# Patient Record
Sex: MALE | Race: OTHER | HISPANIC OR LATINO | URBAN - METROPOLITAN AREA
[De-identification: names, ages, dates, MRNs, and addresses within clinical notes are randomized per-mention and may not be internally consistent; named-entity substitution may affect disease eponyms.]

---

## 2021-02-08 ENCOUNTER — OUTPATIENT (OUTPATIENT)
Dept: OUTPATIENT SERVICES | Facility: HOSPITAL | Age: 56
LOS: 1 days | End: 2021-02-08
Payer: COMMERCIAL

## 2021-02-08 DIAGNOSIS — Z20.828 CONTACT WITH AND (SUSPECTED) EXPOSURE TO OTHER VIRAL COMMUNICABLE DISEASES: ICD-10-CM

## 2021-02-08 LAB — SARS-COV-2 RNA SPEC QL NAA+PROBE: DETECTED

## 2021-02-08 PROCEDURE — C9803: CPT

## 2021-02-08 PROCEDURE — U0003: CPT

## 2021-02-08 PROCEDURE — U0005: CPT

## 2021-02-09 DIAGNOSIS — Z20.828 CONTACT WITH AND (SUSPECTED) EXPOSURE TO OTHER VIRAL COMMUNICABLE DISEASES: ICD-10-CM

## 2021-02-22 ENCOUNTER — TRANSCRIPTION ENCOUNTER (OUTPATIENT)
Age: 56
End: 2021-02-22

## 2021-02-25 ENCOUNTER — NON-APPOINTMENT (OUTPATIENT)
Age: 56
End: 2021-02-25

## 2021-03-04 ENCOUNTER — APPOINTMENT (OUTPATIENT)
Dept: GASTROENTEROLOGY | Facility: CLINIC | Age: 56
End: 2021-03-04
Payer: COMMERCIAL

## 2021-03-04 VITALS
HEART RATE: 79 BPM | TEMPERATURE: 98 F | BODY MASS INDEX: 32.78 KG/M2 | WEIGHT: 204 LBS | SYSTOLIC BLOOD PRESSURE: 158 MMHG | DIASTOLIC BLOOD PRESSURE: 87 MMHG | HEIGHT: 66 IN

## 2021-03-04 DIAGNOSIS — K64.4 RESIDUAL HEMORRHOIDAL SKIN TAGS: ICD-10-CM

## 2021-03-04 DIAGNOSIS — Z12.11 ENCOUNTER FOR SCREENING FOR MALIGNANT NEOPLASM OF COLON: ICD-10-CM

## 2021-03-04 PROCEDURE — 99202 OFFICE O/P NEW SF 15 MIN: CPT

## 2021-03-04 PROCEDURE — 99072 ADDL SUPL MATRL&STAF TM PHE: CPT

## 2021-03-04 RX ORDER — ATORVASTATIN CALCIUM 80 MG/1
TABLET, FILM COATED ORAL
Refills: 0 | Status: ACTIVE | COMMUNITY

## 2021-03-04 RX ORDER — HYDROCORTISONE 25 MG/G
2.5 CREAM TOPICAL DAILY
Qty: 28.35 | Refills: 4 | Status: ACTIVE | COMMUNITY
Start: 2021-03-04 | End: 1900-01-01

## 2021-03-04 RX ORDER — SODIUM SULFATE, POTASSIUM SULFATE, MAGNESIUM SULFATE 17.5; 3.13; 1.6 G/ML; G/ML; G/ML
17.5-3.13-1.6 SOLUTION, CONCENTRATE ORAL
Qty: 1 | Refills: 0 | Status: ACTIVE | COMMUNITY
Start: 2021-03-04 | End: 1900-01-01

## 2021-03-04 NOTE — ASSESSMENT
[FreeTextEntry1] : 55 yo male screening colonoscopy with history of external hemorrhoid. Will use local care and arrange for colonoscopy.

## 2021-03-04 NOTE — PHYSICAL EXAM

## 2021-03-04 NOTE — HISTORY OF PRESENT ILLNESS
[de-identified] : Mr. RAEGAN ALVARADO is a 56 year old male with history of rectal discomfort. Patient noted small protuberance in the rectum and was treated with hydrocortisone with resolution. Patient denies any abdominal pain. Patient never had prior screening. No family history of colitis or colon cancer.\par

## 2021-05-01 ENCOUNTER — APPOINTMENT (OUTPATIENT)
Dept: DISASTER EMERGENCY | Facility: CLINIC | Age: 56
End: 2021-05-01

## 2021-05-01 DIAGNOSIS — Z01.818 ENCOUNTER FOR OTHER PREPROCEDURAL EXAMINATION: ICD-10-CM

## 2021-05-01 LAB — SARS-COV-2 N GENE NPH QL NAA+PROBE: NOT DETECTED

## 2021-05-04 ENCOUNTER — APPOINTMENT (OUTPATIENT)
Dept: GASTROENTEROLOGY | Facility: AMBULATORY MEDICAL SERVICES | Age: 56
End: 2021-05-04
Payer: COMMERCIAL

## 2021-05-04 ENCOUNTER — RESULT REVIEW (OUTPATIENT)
Age: 56
End: 2021-05-04

## 2021-05-04 PROCEDURE — 45385 COLONOSCOPY W/LESION REMOVAL: CPT

## 2021-12-22 ENCOUNTER — EMERGENCY (EMERGENCY)
Facility: HOSPITAL | Age: 56
LOS: 1 days | Discharge: HOME | End: 2021-12-22
Attending: STUDENT IN AN ORGANIZED HEALTH CARE EDUCATION/TRAINING PROGRAM
Payer: COMMERCIAL

## 2021-12-22 VITALS
RESPIRATION RATE: 18 BRPM | TEMPERATURE: 98 F | SYSTOLIC BLOOD PRESSURE: 134 MMHG | OXYGEN SATURATION: 94 % | HEART RATE: 84 BPM | DIASTOLIC BLOOD PRESSURE: 84 MMHG

## 2021-12-22 VITALS — HEIGHT: 66 IN | WEIGHT: 179.9 LBS
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
## 2021-12-22 DIAGNOSIS — T18.5XXA FOREIGN BODY IN ANUS AND RECTUM, INITIAL ENCOUNTER: ICD-10-CM
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
## 2021-12-22 PROCEDURE — 99285 EMERGENCY DEPT VISIT HI MDM: CPT | Mod: 25

## 2021-12-22 PROCEDURE — 46608 ANOSCOPY REMOVE FOR BODY: CPT

## 2021-12-22 PROCEDURE — 74018 RADEX ABDOMEN 1 VIEW: CPT

## 2021-12-22 PROCEDURE — 74018 RADEX ABDOMEN 1 VIEW: CPT | Mod: 26

## 2021-12-22 PROCEDURE — 99284 EMERGENCY DEPT VISIT MOD MDM: CPT

## 2021-12-22 RX ORDER — AZITHROMYCIN 500 MG/1
1 TABLET, FILM COATED ORAL
Qty: 1 | Refills: 0
Start: 2021-12-22

## 2021-12-22 NOTE — ED STATDOCS - CLINICAL SUMMARY MEDICAL DECISION MAKING FREE TEXT BOX
Here with foreign body sensation of anus, patient concerned for chicken bone, will obtain x-ray and do rectal exam in room..

## 2021-12-22 NOTE — ED STATDOCS - PATIENT PORTAL LINK FT
You can access the FollowMyHealth Patient Portal offered by [facility] by registering at the following website: [email or web address]. By joining [facility]’s FollowMyHealth portal, you will also be able to view your health information using other applications (apps) compatible with our system.

## 2021-12-22 NOTE — ED STATDOCS - ATTENDING CONTRIBUTION TO CARE
I, Damian Sampson DO, personally saw the patient with resident.  I have personally performed a face to face diagnostic evaluation on this patient and formulated the patient plan. The case was discussed with, and handed off to resident who followed the case through to the re-evaluation and disposition.

## 2021-12-22 NOTE — ED ADULT TRIAGE NOTE - CHIEF COMPLAINT QUOTE
pt c/o of abd pain. pt believes that he has a chicken bone stuck in his stomach. denies NVD, bleeding or difficulty swallowing

## 2021-12-22 NOTE — ED STATDOCS - OBJECTIVE STATEMENT
55 y/o male with no significant PMHx presents to the ED c/o foreign body in this GI tract. Pt reports he ate a chicken bone and when he went to defecate, he had pain. Pt reports the bone is stuck in his anus when he tried to wipe. Pt denies abdominal pain, nausea, vomiting, diarrhea. Pt reports he went to Urgent Care and was sent to the ED for CT scan. NKDA.

## 2021-12-22 NOTE — CONSULT NOTE ADULT - SUBJECTIVE AND OBJECTIVE BOX
55 yo male who ate a chicken bone yesterday, patient feel some anal pain while having a Bm earlier today. ER resident felt the bone in the anal canal, unable to retrieve it.    HPI:      PAST MEDICAL & SURGICAL HISTORY:    no HTN  Colonoscopy 3 months ago with polyps    REVIEW OF SYSTEMS:    CONSTITUTIONAL: No weakness, fevers or chills  EYES/ENT: No visual changes;  No vertigo or throat pain   NECK: No pain or stiffness  RESPIRATORY: No cough, wheezing, hemoptysis; No shortness of breath  CARDIOVASCULAR: No chest pain or palpitations  GASTROINTESTINAL: No abdominal or epigastric pain. No nausea, vomiting, or hematemesis; No diarrhea or constipation. No melena or hematochezia.  GENITOURINARY: No dysuria, frequency or hematuria  NEUROLOGICAL: No numbness or weakness  SKIN: No itching, burning, rashes, or lesions   All other review of systems is negative unless indicated above.    MEDICATIONS  (STANDING):    MEDICATIONS  (PRN):      Allergies    No Known Allergies    Intolerances        SOCIAL HISTORY: non smoker    FAMILY HISTORY: non contributory      Vital Signs Last 24 Hrs  T(C): 36.8 (22 Dec 2021 17:16), Max: 36.8 (22 Dec 2021 17:16)  T(F): 98.3 (22 Dec 2021 17:16), Max: 98.3 (22 Dec 2021 17:16)  HR: 84 (22 Dec 2021 17:16) (84 - 84)  BP: 134/84 (22 Dec 2021 17:16) (134/84 - 134/84)  BP(mean): 97 (22 Dec 2021 17:16) (97 - 97)  RR: 18 (22 Dec 2021 17:16) (18 - 18)  SpO2: 94% (22 Dec 2021 17:16) (94% - 94%)    .  VITAL SIGNS:  T(C): 36.8 (12-22-21 @ 17:16), Max: 36.8 (12-22-21 @ 17:16)  T(F): 98.3 (12-22-21 @ 17:16), Max: 98.3 (12-22-21 @ 17:16)  HR: 84 (12-22-21 @ 17:16) (84 - 84)  BP: 134/84 (12-22-21 @ 17:16) (134/84 - 134/84)  BP(mean): 97 (12-22-21 @ 17:16) (97 - 97)  RR: 18 (12-22-21 @ 17:16) (18 - 18)  SpO2: 94% (12-22-21 @ 17:16) (94% - 94%)  Wt(kg): --    PHYSICAL EXAM:    Constitutional: resting comfortably in bed; NAD  Eyes: PERRL, EOMI, anicteric sclera  ENT: no nasal discharge; uvula midline, no oropharyngeal erythema or exudates  Neck: supple; no JVD or thyromegaly  Respiratory: CTA B/L; no W/R/R, no retractions  Cardiac: +S1/S2; RRR; no murmurs  Gastrointestinal: soft, NT/ND; no rebound or guarding; +BSx4  FLAKO showed sharp chicken bone in the anal canal  Anoscopy performed showing a chicken bone in the anal canal which was grabbed with forcep and removed  Back: spine midline, no bony tenderness or step-offs; no CVAT B/L  Extremities: no clubbing or cyanosis; no peripheral edema  Musculoskeletal: NROM x4; no joint swelling, tenderness or erythema  Vascular: 2+ radial, femoral, DP/PT pulses B/L  Dermatologic: skin warm, dry and intact; no rashes, wounds, or scars  Lymphatic: no submandibular or cervical LAD  Neurologic: AAOx3; CNII-XII grossly intact; no focal deficits  Psychiatric: affect and characteristics of appearance, verbalizations, behaviors are appropriate    LABS:                      RADIOLOGY & ADDITIONAL STUDIES:

## 2021-12-22 NOTE — ED STATDOCS - NSFOLLOWUPINSTRUCTIONS_ED_ALL_ED_FT
Rectal Foreign Body Removal       Rectal foreign body removal is a procedure to remove an object that is stuck in the end of the large intestine (rectum). During this procedure, your health care provider will use instruments to find and remove the object through the anus. Depending on the size and location of the object, you may need to have medicine to numb the area or make you fall asleep (anesthetic). In some cases, surgery may be needed to remove the object.      Tell a health care provider about:    •Any allergies you have.      •All medicines you are taking, including vitamins, herbs, eye drops, creams, and over-the-counter medicines.      •Any problems you or family members have had with anesthetic medicines.      •Any blood disorders you have.      •Any surgeries you have had.      •Any medical conditions you have.      •Whether you are pregnant or may be pregnant.        What are the risks?  Generally, this is a safe procedure. However, problems may occur, including:  •Infection.      •Bleeding.      •Allergic reactions to medicines.      •Tearing or damage to the rectum (perforation).      •Damage to other structures or organs.        What happens before the procedure?    Medicines   Ask your health care provider about:  •Changing or stopping your regular medicines. This is especially important if you are taking diabetes medicines or blood thinners.      •Taking medicines such as aspirin and ibuprofen. These medicines can thin your blood. Do not take these medicines unless your health care provider tells you to take them.       •Taking over-the-counter medicines, vitamins, herbs, and supplements.      Surgery safety  Ask your health care provider:  •How your surgery site will be marked.    •What steps will be taken to help prevent infection. These steps may include:  •Removing hair at the surgery site.      •Washing skin with a germ-killing soap.      •Taking antibiotic medicine.        General instructions    •Your health care provider may ask you to describe the size and shape of the object inside your rectum.      •Your health care provider may feel inside your rectum with a finger (digital rectal exam).      •You may have X-rays or other imaging tests.      •Follow instructions from your health care provider about eating or drinking restrictions.      •Plan to have someone take you home from the hospital or clinic.        What happens during the procedure?    •An IV may be inserted into one of your veins.    •You may be given one or more of the following:  •A medicine to help you relax (sedative).      •A medicine to numb the area (local anesthetic).      •A medicine to make you fall asleep (general anesthetic).      •A medicine that is injected into an area of your body to numb everything below the injection site (regional anesthetic).        •A lubricating ointment may be placed inside your rectum.      •If the foreign body is not visible, an instrument called a speculum may be used to widen your anal canal.      •An instrument will be used to grasp and remove the object. You may be asked to try to help push the object out with your muscles.      •If the object is deep in the rectum, an operating scope (anoscope or proctoscope) may be used to find and remove the object.      •After the object is removed, an anoscope or proctoscope may be used to check your rectum for injury.      The procedure may vary among health care providers and hospitals.      What happens after the procedure?    •Your blood pressure, heart rate, breathing rate, and blood oxygen level may be monitored until you leave the hospital or clinic.      •If you were given a sedative during the procedure, it can affect you for several hours. Do not drive or operate machinery until your health care provider says that it is safe.        Summary    •Rectal foreign body removal is a procedure to remove an object that is stuck in the end of the large intestine (rectum).      •Ask your health care provider about stopping any medicines before the procedure.      •Plan to have someone take you home from the hospital or clinic after the procedure.      This information is not intended to replace advice given to you by your health care provider. Make sure you discuss any questions you have with your health care provider.

## 2021-12-22 NOTE — CONSULT NOTE ADULT - ASSESSMENT
55 yo male who swallowed a chicken bone  -Anoscopy performed visualizing 3-4 cm chicken bone which was removed. Procedure explained to patient before performing anoscopy. No issues.  -D/c home

## 2022-09-26 PROBLEM — Z78.9 OTHER SPECIFIED HEALTH STATUS: Chronic | Status: ACTIVE | Noted: 2021-12-29

## 2022-10-06 ENCOUNTER — APPOINTMENT (OUTPATIENT)
Dept: CT IMAGING | Facility: CLINIC | Age: 57
End: 2022-10-06

## 2024-07-25 ENCOUNTER — APPOINTMENT (OUTPATIENT)
Dept: COLORECTAL SURGERY | Facility: CLINIC | Age: 59
End: 2024-07-25

## 2024-09-03 ENCOUNTER — NON-APPOINTMENT (OUTPATIENT)
Age: 59
End: 2024-09-03

## 2024-09-03 VITALS — WEIGHT: 180 LBS | BODY MASS INDEX: 28.93 KG/M2 | HEIGHT: 66 IN

## 2024-09-10 ENCOUNTER — APPOINTMENT (OUTPATIENT)
Dept: CT IMAGING | Facility: CLINIC | Age: 59
End: 2024-09-10